# Patient Record
Sex: MALE | Race: WHITE | NOT HISPANIC OR LATINO
[De-identification: names, ages, dates, MRNs, and addresses within clinical notes are randomized per-mention and may not be internally consistent; named-entity substitution may affect disease eponyms.]

---

## 2022-05-03 PROBLEM — Z00.00 ENCOUNTER FOR PREVENTIVE HEALTH EXAMINATION: Status: ACTIVE | Noted: 2022-05-03

## 2022-05-20 ENCOUNTER — APPOINTMENT (OUTPATIENT)
Dept: PODIATRY | Facility: CLINIC | Age: 77
End: 2022-05-20
Payer: MEDICARE

## 2022-05-20 ENCOUNTER — NON-APPOINTMENT (OUTPATIENT)
Age: 77
End: 2022-05-20

## 2022-05-20 VITALS — WEIGHT: 170 LBS | BODY MASS INDEX: 27.32 KG/M2 | HEIGHT: 66 IN

## 2022-05-20 DIAGNOSIS — E78.00 PURE HYPERCHOLESTEROLEMIA, UNSPECIFIED: ICD-10-CM

## 2022-05-20 DIAGNOSIS — Z78.9 OTHER SPECIFIED HEALTH STATUS: ICD-10-CM

## 2022-05-20 DIAGNOSIS — Z87.09 PERSONAL HISTORY OF OTHER DISEASES OF THE RESPIRATORY SYSTEM: ICD-10-CM

## 2022-05-20 DIAGNOSIS — I10 ESSENTIAL (PRIMARY) HYPERTENSION: ICD-10-CM

## 2022-05-20 DIAGNOSIS — S90.822A BLISTER (NONTHERMAL), LEFT FOOT, INITIAL ENCOUNTER: ICD-10-CM

## 2022-05-20 DIAGNOSIS — E07.9 DISORDER OF THYROID, UNSPECIFIED: ICD-10-CM

## 2022-05-20 DIAGNOSIS — Z87.891 PERSONAL HISTORY OF NICOTINE DEPENDENCE: ICD-10-CM

## 2022-05-20 PROCEDURE — 11056 PARNG/CUTG B9 HYPRKR LES 2-4: CPT

## 2022-05-20 PROCEDURE — L3000: CPT

## 2022-05-20 PROCEDURE — 97760 ORTHOTIC MGMT&TRAING 1ST ENC: CPT | Mod: GP

## 2022-05-20 PROCEDURE — 99203 OFFICE O/P NEW LOW 30 MIN: CPT | Mod: 25

## 2022-05-20 RX ORDER — LEVOTHYROXINE SODIUM 0.07 MG/1
75 TABLET ORAL
Refills: 0 | Status: ACTIVE | COMMUNITY

## 2022-05-20 RX ORDER — DOXYCYCLINE HYCLATE 20 MG/1
20 TABLET ORAL
Refills: 0 | Status: ACTIVE | COMMUNITY

## 2022-05-20 RX ORDER — ATORVASTATIN CALCIUM 40 MG/1
40 TABLET, FILM COATED ORAL
Refills: 0 | Status: ACTIVE | COMMUNITY

## 2022-05-20 RX ORDER — ATENOLOL 25 MG/1
25 TABLET ORAL
Refills: 0 | Status: ACTIVE | COMMUNITY

## 2022-05-20 NOTE — REASON FOR VISIT
[Initial Visit] : an initial visit for [Foot Deformity] : foot deformity [Foot Pain] : foot pain [FreeTextEntry2] : left foot

## 2022-05-20 NOTE — PHYSICAL EXAM
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 [Skin Color & Pigmentation] : normal skin color and pigmentation [Skin Turgor] : normal skin turgor [] : no rash [Sensation] : the sensory exam was normal to light touch and pinprick [No Focal Deficits] : no focal deficits [Deep Tendon Reflexes (DTR)] : deep tendon reflexes were 2+ and symmetric [Motor Exam] : the motor exam was normal [Oriented To Time, Place, And Person] : oriented to person, place, and time [Impaired Insight] : insight and judgment were intact [Affect] : the affect was normal [FreeTextEntry3] : The vascular exam reveals decreased pedal pulses bilateral, a capillary fill time of 3-5 seconds,  mild atrophic skin changes, mild varicosities absence of hair growth, but no cyanosis, clubbing or mottling seen. [de-identified] : overall muscle strength testing is decreased, but consistant with the patients age and medical problems. Mild atrophy and overall weakness present.\par Limb length difference noted from Knee to MM left shorter [Foot Ulcer] : no foot ulcer [Skin Induration] : no skin induration [FreeTextEntry1] : IPK sub 4 left\par Blister 5th toe left ( shoe)

## 2022-05-20 NOTE — PROCEDURE
[FreeTextEntry1] : LLD:\par Based on my physical examination and my clinical findings and the patient's description of the symptoms, a complete differential diagnosis was reviewed with the patient. Possible diagnoses as well as treatment options explained in great detail. All questions asked and answered appropriately.\par During the evaluation and management I had a lengthy discussion with the patient regarding benefits of functional foot orthoses. I explained to the patient the etiology and treatment options and one of them included the offloading and balancing of the painful portion of the foot. I explained the importance of balancing in offloading the painful area as part of the overall treatment process to advance healing. I have asked the patient to consider this as part of the treatment\par After a lengthy discussion with the patient regarding the possible benefits of orthotics and what we hope to achieve with them as it relates to their diagnosis the patient has agreed to be casted for the devices, The patient was then casted for a pair of custom functional foot orthoses with the subtalar joint in neutral, the forefoot locked, The patient was advised that they will be notified when the orthotics are returned from the laboratory, Should there be any questions or concerns they were advised to contact the office immediately, Educational literature regarding orthotics were dispensed, Included in the casting for orthotics was an overall gait exam and biomechanical evaluation\par \par IPK:\par Shaving and padding of a benign hyperkeratotic lesion\par I have had a lengthy discussion with the patient regarding overall skincare. The importance of the type of socks, the type of shoes, and the type of overall foot hygiene is important to help control and prevent eruptions especially over extreme weather changes. This included but was not limited to hydration and lubrication, dove soap, triple rinse clothing and linens. I also explained the importance of thorough drying of both feet especially the web spaces. Given the extreme temperatures back in a car I also reviewed the type of shoes that would help reduce the chances of cracking of the skin especially leading to fissuring of the heels. Overall skincare precautions were reviewed education literature dispensed in the patient's questions asked and answered appropriately.\par \par Blister:\par A complete and thorough evaluation of the type of shoes they should be wearing and type of shoes for this time of year was discussed with patient.\par \par I have reviewed the care orders with the patient they understand and they also understands the long-term consequences of not following my wound care orders as well  precautions I have recommended  and shoe gear advice I have mentioned.\par Betadine daily\par \par follow up appt 4 weeks\par

## 2022-05-20 NOTE — HISTORY OF PRESENT ILLNESS
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

## 2022-06-17 ENCOUNTER — APPOINTMENT (OUTPATIENT)
Dept: PODIATRY | Facility: CLINIC | Age: 77
End: 2022-06-17
Payer: MEDICARE

## 2022-06-17 VITALS — BODY MASS INDEX: 27.32 KG/M2 | WEIGHT: 170 LBS | HEIGHT: 66 IN

## 2022-06-17 DIAGNOSIS — M21.70 UNEQUAL LIMB LENGTH (ACQUIRED), UNSPECIFIED SITE: ICD-10-CM

## 2022-06-17 DIAGNOSIS — I70.91 GENERALIZED ATHEROSCLEROSIS: ICD-10-CM

## 2022-06-17 DIAGNOSIS — G89.29 PAIN IN RIGHT FOOT: ICD-10-CM

## 2022-06-17 DIAGNOSIS — M79.671 PAIN IN RIGHT FOOT: ICD-10-CM

## 2022-06-17 PROCEDURE — 97760 ORTHOTIC MGMT&TRAING 1ST ENC: CPT

## 2022-06-17 RX ORDER — LEVOTHYROXINE SODIUM 0.05 MG/1
50 TABLET ORAL
Qty: 90 | Refills: 0 | Status: ACTIVE | COMMUNITY
Start: 2022-06-07

## 2022-06-17 NOTE — REASON FOR VISIT
[Follow-Up Visit] : a follow-up visit for [Foot Deformity] : foot deformity [Foot Pain] : foot pain [FreeTextEntry2] : left foot

## 2022-06-17 NOTE — PHYSICAL EXAM
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 [de-identified] : overall muscle strength testing is decreased, but consistant with the patients age and medical problems. Mild atrophy and overall weakness present.\par Limb length difference noted from Knee to MM left shorter [Foot Ulcer] : no foot ulcer [Skin Induration] : no skin induration [FreeTextEntry1] : IPK sub 4 left\par Blister 5th toe left ( shoe)

## 2022-06-17 NOTE — HISTORY OF PRESENT ILLNESS
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

## 2022-06-17 NOTE — PROCEDURE
[FreeTextEntry1] : The patient presents for dispensing of custom molded foot orthotics. I have removed the orthotics from the packaging and I have examined him. They do appear to be made as per my instructions regarding materials additions corrections. Upon placing him to the patient's foot they do appear to fit nicely. There is no material failure nor gapping. They were then trimmed to fit and placed inside the patient's shoe gear. There appears to be a good fit. Upon initial ambulation the patient has no initial complaints regarding pain off edges were tight fit. The patient did ambulate around the office for several minutes and had a favorable result. I then explained to the patient the normal break-in period. The paperwork supplied by the laboratory was reviewed with the patient. They understand a normal break-in period is to be gradual over several weeks. I've advised the patient that different, weird, and uncomfortable are certainly acceptable words in the beginning however pain, blister and callus are things that should not occur. Once the proper break-in was explained to the patient they were given an appointment to follow up.\par \par follow up appt 4 weeks\par

## 2022-07-14 ENCOUNTER — APPOINTMENT (OUTPATIENT)
Dept: PODIATRY | Facility: CLINIC | Age: 77
End: 2022-07-14

## 2022-07-14 VITALS — BODY MASS INDEX: 27.32 KG/M2 | WEIGHT: 170 LBS | HEIGHT: 66 IN

## 2022-07-14 DIAGNOSIS — L85.1 ACQUIRED KERATOSIS [KERATODERMA] PALMARIS ET PLANTARIS: ICD-10-CM

## 2022-07-14 DIAGNOSIS — R26.9 UNSPECIFIED ABNORMALITIES OF GAIT AND MOBILITY: ICD-10-CM

## 2022-07-14 DIAGNOSIS — M79.672 PAIN IN LEFT FOOT: ICD-10-CM

## 2022-07-14 DIAGNOSIS — Q66.72 CONGEN PES CAVUS, LT FOOT: ICD-10-CM

## 2022-07-14 DIAGNOSIS — G89.29 PAIN IN LEFT FOOT: ICD-10-CM

## 2022-07-14 PROCEDURE — 99213 OFFICE O/P EST LOW 20 MIN: CPT

## 2022-07-14 RX ORDER — CEFUROXIME AXETIL 500 MG/1
500 TABLET ORAL
Qty: 10 | Refills: 0 | Status: ACTIVE | COMMUNITY
Start: 2022-01-11

## 2022-07-14 RX ORDER — SULFAMETHOXAZOLE AND TRIMETHOPRIM 800; 160 MG/1; MG/1
800-160 TABLET ORAL
Qty: 10 | Refills: 0 | Status: ACTIVE | COMMUNITY
Start: 2022-01-11

## 2022-07-14 RX ORDER — ATENOLOL 50 MG/1
50 TABLET ORAL
Qty: 90 | Refills: 0 | Status: ACTIVE | COMMUNITY
Start: 2022-06-30

## 2022-07-14 RX ORDER — DOXYCYCLINE HYCLATE 100 MG/1
100 TABLET ORAL
Qty: 90 | Refills: 0 | Status: ACTIVE | COMMUNITY
Start: 2022-04-29

## 2022-07-14 NOTE — PROCEDURE
[FreeTextEntry1] : I have had a lengthy discussion with the patient regarding overall skincare. The importance of the type of socks, the type of shoes, and the type of overall foot hygiene is important to help control and prevent eruptions especially over extreme weather changes. This included but was not limited to hydration and lubrication, dove soap, triple rinse clothing and linens. I also explained the importance of thorough drying of both feet especially the web spaces. Given the extreme temperatures back in a car I also reviewed the type of shoes that would help reduce the chances of cracking of the skin especially leading to fissuring of the heels. Overall skincare precautions were reviewed education literature dispensed in the patient's questions asked and answered appropriately.\par A complete and thorough evaluation of the type of shoes they should be wearing and type of shoes for this time of year was discussed with patient.\par \par follow up prn\par

## 2022-07-14 NOTE — HISTORY OF PRESENT ILLNESS
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

## 2022-07-14 NOTE — PHYSICAL EXAM
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 [de-identified] : overall muscle strength testing is decreased, but consistant with the patients age and medical problems. Mild atrophy and overall weakness present.\par Limb length difference noted from Knee to MM left shorter [Foot Ulcer] : no foot ulcer [Skin Induration] : no skin induration [FreeTextEntry1] : IPK sub 4 left--patient states pad is in good spot, and feels no recurrance of lesions